# Patient Record
(demographics unavailable — no encounter records)

---

## 2025-01-31 NOTE — HISTORY OF PRESENT ILLNESS
[FreeTextEntry1] : LMP 12/17 but she had a positive pregnancy test and some bleeding Seen to confirm pregnancy History of irregular periods PAtient had a positive pregnancy test in office and a scan was done Scan showed a small gestation sac but no fetal pole two small adjacent cysts were noted in myometrium Small corpus luteum noted on oavry with cyst and ring of fire color Will order B hcg and a followup in 2 weeks

## 2025-02-13 NOTE — HISTORY OF PRESENT ILLNESS
[FreeTextEntry1] : 12/17/2024 [Currently Active] : currently active [Men] : men [Vaginal] : vaginal [No] : No